# Patient Record
Sex: MALE | Race: WHITE
[De-identification: names, ages, dates, MRNs, and addresses within clinical notes are randomized per-mention and may not be internally consistent; named-entity substitution may affect disease eponyms.]

---

## 2018-04-25 ENCOUNTER — HOSPITAL ENCOUNTER (EMERGENCY)
Dept: HOSPITAL 26 - MED | Age: 23
Discharge: HOME | End: 2018-04-25
Payer: COMMERCIAL

## 2018-04-25 VITALS — WEIGHT: 140 LBS | BODY MASS INDEX: 20.73 KG/M2 | HEIGHT: 69 IN

## 2018-04-25 VITALS — DIASTOLIC BLOOD PRESSURE: 89 MMHG | SYSTOLIC BLOOD PRESSURE: 132 MMHG

## 2018-04-25 VITALS — DIASTOLIC BLOOD PRESSURE: 65 MMHG | SYSTOLIC BLOOD PRESSURE: 119 MMHG

## 2018-04-25 DIAGNOSIS — R11.2: ICD-10-CM

## 2018-04-25 DIAGNOSIS — R19.7: ICD-10-CM

## 2018-04-25 DIAGNOSIS — R10.9: Primary | ICD-10-CM

## 2018-04-25 LAB
ALBUMIN FLD-MCNC: 3.9 G/DL (ref 3.4–5)
ANION GAP SERPL CALCULATED.3IONS-SCNC: 11.3 MMOL/L (ref 8–16)
AST SERPL-CCNC: 18 U/L (ref 15–37)
BILIRUB SERPL-MCNC: 0.4 MG/DL (ref 0–1)
BUN SERPL-MCNC: 16 MG/DL (ref 7–18)
CHLORIDE SERPL-SCNC: 107 MMOL/L (ref 98–107)
CO2 SERPL-SCNC: 28.9 MMOL/L (ref 21–32)
CREAT SERPL-MCNC: 1 MG/DL (ref 0.7–1.3)
GFR SERPL CREATININE-BSD FRML MDRD: 119 ML/MIN (ref 90–?)
GLUCOSE SERPL-MCNC: 107 MG/DL (ref 74–106)
LIPASE SERPL-CCNC: 123 U/L (ref 73–393)
POTASSIUM SERPL-SCNC: 4.2 MMOL/L (ref 3.5–5.1)
SODIUM SERPL-SCNC: 143 MMOL/L (ref 136–145)

## 2018-04-25 PROCEDURE — 80053 COMPREHEN METABOLIC PANEL: CPT

## 2018-04-25 PROCEDURE — 83690 ASSAY OF LIPASE: CPT

## 2018-04-25 PROCEDURE — 99284 EMERGENCY DEPT VISIT MOD MDM: CPT

## 2018-04-25 PROCEDURE — 96372 THER/PROPH/DIAG INJ SC/IM: CPT

## 2018-04-25 PROCEDURE — 36415 COLL VENOUS BLD VENIPUNCTURE: CPT

## 2018-04-25 PROCEDURE — S0119 ONDANSETRON 4 MG: HCPCS

## 2018-04-25 NOTE — NUR
24 YO M BIB MOTHER W/ C/O N/V/D AND ABD PAIN 8/10 THAT WORSENS WHEN HE EATS OR 
THROWS UP. PT DENIES APPETITE CHANGES AT THIS TIME. STATES THAT HE DID EAT A 
PIECE OF RAW MEAT ABOUT 4 DAYS AGO AT [facility] AND SYMPTOMS STARTED SHORTLY 
AFTER. PT A&O X 4. [facility] 15. CMS INTACT. AMBULATORY W/ STEADY GAIT. MUCOUS 
MEMBRAINES MOIST. ABD SOFT, NON-TENDER. ER MD WANG NOTIFIED. PT MEEDS MET. 
SAFETY PRECAUTIONS IN PLACE. WILL CONTINUE TO MONITOR.

## 2018-04-25 NOTE — NUR
Patient discharged with v/s stable. Written and verbal after care instructions 
given and explained. 

Patient alert, oriented and verbalized understanding of instructions. 
Ambulatory with steady gait. All questions addressed prior to discharge. ID 
band removed. Patient advised to follow up with PMD. Rx of BENTYL, ZOFRAN, 
CIPRO given. Patient educated on indication of medication including possible 
reaction and side effects. Opportunity to ask questions provided and answered.

## 2018-06-06 ENCOUNTER — HOSPITAL ENCOUNTER (EMERGENCY)
Dept: HOSPITAL 26 - MED | Age: 23
Discharge: HOME | End: 2018-06-06
Payer: COMMERCIAL

## 2018-06-06 VITALS — HEIGHT: 69 IN | BODY MASS INDEX: 26.25 KG/M2 | WEIGHT: 177.25 LBS

## 2018-06-06 VITALS — DIASTOLIC BLOOD PRESSURE: 85 MMHG | SYSTOLIC BLOOD PRESSURE: 135 MMHG

## 2018-06-06 VITALS — DIASTOLIC BLOOD PRESSURE: 63 MMHG | SYSTOLIC BLOOD PRESSURE: 117 MMHG

## 2018-06-06 DIAGNOSIS — K64.5: Primary | ICD-10-CM

## 2018-06-06 DIAGNOSIS — Z91.018: ICD-10-CM

## 2018-06-06 DIAGNOSIS — Z79.899: ICD-10-CM

## 2018-06-06 NOTE — NUR
PATIENT PRESENTS TO ED WITH COMPLAINTS OF RECTAL BLEEDING. PATIENT STATES HE 
NOTICED IT YESTERDAY AFTER BOWEL MOVEMENT. DENIES CONSTIPATION, N/V/D. SKIN IS 
PINK/WARM/DRY; AAOX4 WITH EVEN AND STEADY GAIT; LUNGS CLEAR BL; HR EVEN AND 
REGULAR; PT DENIES ANY FEVER, CP, SOB, OR COUGH AT THIS TIME; VSS; PATIENT 
POSITIONED FOR COMFORT; HOB ELEVATED; BEDRAILS UP X1; BED DOWN. ER MD MADE 
AWARE OF PT STATUS.

## 2018-06-06 NOTE — NUR
Patient discharged with v/s stable. Written and verbal after care instructions 
given and explained. 

Patient alert, oriented and verbalized understanding of instructions. 
Ambulatory with steady gait. All questions addressed prior to discharge. ID 
band removed. Patient advised to follow up with PMD. Rx of RECTICARE KIT, 
ANUSOL, COLACE, AND WITCH HAZEL given. Patient educated on indication of 
medication including possible reaction and side effects. Opportunity to ask 
questions provided and answered.

## 2018-08-31 ENCOUNTER — HOSPITAL ENCOUNTER (EMERGENCY)
Dept: HOSPITAL 26 - MED | Age: 23
Discharge: HOME | End: 2018-08-31
Payer: COMMERCIAL

## 2018-08-31 VITALS — SYSTOLIC BLOOD PRESSURE: 118 MMHG | DIASTOLIC BLOOD PRESSURE: 64 MMHG

## 2018-08-31 VITALS — BODY MASS INDEX: 25.16 KG/M2 | WEIGHT: 166 LBS | HEIGHT: 68 IN

## 2018-08-31 VITALS — DIASTOLIC BLOOD PRESSURE: 63 MMHG | SYSTOLIC BLOOD PRESSURE: 119 MMHG

## 2018-08-31 DIAGNOSIS — F12.188: Primary | ICD-10-CM

## 2018-08-31 DIAGNOSIS — K31.84: ICD-10-CM

## 2018-08-31 DIAGNOSIS — Z79.899: ICD-10-CM

## 2018-08-31 DIAGNOSIS — Z91.048: ICD-10-CM

## 2018-08-31 LAB
ALBUMIN FLD-MCNC: 4.3 G/DL (ref 3.4–5)
ANION GAP SERPL CALCULATED.3IONS-SCNC: 9.2 MMOL/L (ref 8–16)
APPEARANCE UR: CLEAR
AST SERPL-CCNC: 14 U/L (ref 15–37)
BARBITURATES UR QL SCN: (no result) NG/ML
BASOPHILS # BLD AUTO: 0 K/UL (ref 0–0.22)
BASOPHILS NFR BLD AUTO: 0.6 % (ref 0–2)
BENZODIAZ UR QL SCN: (no result) NG/ML
BILIRUB SERPL-MCNC: 0.3 MG/DL (ref 0–1)
BILIRUB UR QL STRIP: NEGATIVE
BUN SERPL-MCNC: 15 MG/DL (ref 7–18)
BZE UR QL SCN: (no result) NG/ML
CANNABINOIDS UR QL SCN: (no result) NG/ML
CHLORIDE SERPL-SCNC: 107 MMOL/L (ref 98–107)
CO2 SERPL-SCNC: 29.9 MMOL/L (ref 21–32)
COLOR UR: YELLOW
CREAT SERPL-MCNC: 1 MG/DL (ref 0.7–1.3)
EOSINOPHIL # BLD AUTO: 0.1 K/UL (ref 0–0.4)
EOSINOPHIL NFR BLD AUTO: 1.6 % (ref 0–4)
ERYTHROCYTE [DISTWIDTH] IN BLOOD BY AUTOMATED COUNT: 14.1 % (ref 11.6–13.7)
GFR SERPL CREATININE-BSD FRML MDRD: 119 ML/MIN (ref 90–?)
GLUCOSE SERPL-MCNC: 95 MG/DL (ref 74–106)
GLUCOSE UR STRIP-MCNC: NEGATIVE MG/DL
HCT VFR BLD AUTO: 44.4 % (ref 36–52)
HGB BLD-MCNC: 14.8 G/DL (ref 12–18)
HGB UR QL STRIP: NEGATIVE
LEUKOCYTE ESTERASE UR QL STRIP: NEGATIVE
LIPASE SERPL-CCNC: 195 U/L (ref 73–393)
LYMPHOCYTES # BLD AUTO: 1.2 K/UL (ref 2–11.5)
LYMPHOCYTES NFR BLD AUTO: 18.7 % (ref 20.5–51.1)
MCH RBC QN AUTO: 30 PG (ref 27–31)
MCHC RBC AUTO-ENTMCNC: 33 G/DL (ref 33–37)
MCV RBC AUTO: 89.3 FL (ref 80–94)
MONOCYTES # BLD AUTO: 0.3 K/UL (ref 0.8–1)
MONOCYTES NFR BLD AUTO: 4.2 % (ref 1.7–9.3)
NEUTROPHILS # BLD AUTO: 4.8 K/UL (ref 1.8–7.7)
NEUTROPHILS NFR BLD AUTO: 74.9 % (ref 42.2–75.2)
NITRITE UR QL STRIP: NEGATIVE
OPIATES UR QL SCN: (no result) NG/ML
PCP UR QL SCN: (no result) NG/ML
PH UR STRIP: 7.5 [PH] (ref 5–9)
PLATELET # BLD AUTO: 264 K/UL (ref 140–450)
POTASSIUM SERPL-SCNC: 5.1 MMOL/L (ref 3.5–5.1)
RBC # BLD AUTO: 4.97 MIL/UL (ref 4.2–6.1)
SODIUM SERPL-SCNC: 141 MMOL/L (ref 136–145)
WBC # BLD AUTO: 6.5 K/UL (ref 4.8–10.8)

## 2018-08-31 NOTE — NUR
22 yo m to er w/c/o n/v in the am after taking multivit. pt admits to marijuana 
use in the evenings, pt states 1 ebisode vomiting in the am for 2 mo 1-2 times 
a wk. pt denies any cp, sob, abd pain, n/v/d at this time. LS clear throughout. 
BS activex4 , abc soft and non tender. will continue to monitor. pt positioned 
for comfort er md made aware.

## 2018-08-31 NOTE — NUR
Patient discharged with v/s stable. Written and verbal after care instructions 
given and explained. 

Patient alert, oriented and verbalized understanding of instructions. 
Ambulatory with steady gait. All questions addressed prior to discharge. ID 
band removed. Patient advised to follow up with PMD. Rx of ZOFRAN, REGLAN 
given. Patient educated on indication of medication including possible reaction 
and side effects. Opportunity to ask questions provided and answered.